# Patient Record
Sex: FEMALE | Race: WHITE | NOT HISPANIC OR LATINO | URBAN - METROPOLITAN AREA
[De-identification: names, ages, dates, MRNs, and addresses within clinical notes are randomized per-mention and may not be internally consistent; named-entity substitution may affect disease eponyms.]

---

## 2021-02-23 ENCOUNTER — IMPORTED ENCOUNTER (OUTPATIENT)
Dept: URBAN - METROPOLITAN AREA CLINIC 31 | Facility: CLINIC | Age: 71
End: 2021-02-23

## 2021-02-23 PROBLEM — H25.813: Noted: 2021-02-23

## 2021-02-23 PROCEDURE — 92015 DETERMINE REFRACTIVE STATE: CPT

## 2021-02-23 PROCEDURE — 99204 OFFICE O/P NEW MOD 45 MIN: CPT

## 2021-02-23 NOTE — PATIENT DISCUSSION
I was planning on sometime the next week or 2 .You can put her in on my Dom visit for next Tuesday afternoon at the facility and I will give her flu shot there then.   Refractive error - myopic shift. Hold on glasses change.  Recommend cataract eval first.

## 2021-02-23 NOTE — PATIENT DISCUSSION
1. Combined cataract OU - significant progression. Consult. 2. Refractive error - myopic shift. Hold on glasses change. Recommend cataract eval first.3. Return for an appointment in 1 week for cataract evaluation. with Dr. Mireille Goetz

## 2021-04-19 ENCOUNTER — IMPORTED ENCOUNTER (OUTPATIENT)
Dept: URBAN - METROPOLITAN AREA CLINIC 31 | Facility: CLINIC | Age: 71
End: 2021-04-19

## 2021-04-19 PROBLEM — H43.812: Noted: 2021-04-19

## 2021-04-19 PROBLEM — H25.813: Noted: 2021-04-19

## 2021-04-19 PROCEDURE — 99213 OFFICE O/P EST LOW 20 MIN: CPT

## 2021-04-19 NOTE — PATIENT DISCUSSION
1.  Discussed the risks benefits alternatives and limitations of cataract surgery including infection bleeding loss of vision retinal tears detachment. The patient stated a full understanding and a desire to proceed with the procedure in both eyes. Refractive options were reviewed. Patient has elected to be optimized for distance vision in both eyes. The patient will still need glasses for reading and to possibly fine tune distance vision. Schedule KPE/IOL OD/OS. 2. PVD OS: Patient was cautioned to call our office immediately if they experience a substantial change in their symptoms such as an increase in floaters persistent flashes loss of visual field (may appear as a shadow or a curtain) or decrease in visual acuity as these may indicate a retinal tear or detachment. If this is a new problem patient will need to return for re-examination  as determined by the [address]. Return for an appointment for [address] [place] [address] with Dr. Franko Bergman.

## 2022-04-02 ASSESSMENT — TONOMETRY
OD_IOP_MMHG: 15
OD_IOP_MMHG: 16
OS_IOP_MMHG: 14
OS_IOP_MMHG: 16

## 2022-04-02 ASSESSMENT — VISUAL ACUITY
OD_CC: 20/200
OD_CC: J3
OS_PH: CC 20/40
OD_SC: 20/60
OS_CC: J2
OD_SC: 20/60+1
OS_SC: 20/50-1
OS_CC: 20/50-1
OS_SC: 20/50